# Patient Record
Sex: FEMALE | Race: BLACK OR AFRICAN AMERICAN | NOT HISPANIC OR LATINO
[De-identification: names, ages, dates, MRNs, and addresses within clinical notes are randomized per-mention and may not be internally consistent; named-entity substitution may affect disease eponyms.]

---

## 2020-08-13 ENCOUNTER — APPOINTMENT (OUTPATIENT)
Dept: ENDOCRINOLOGY | Facility: CLINIC | Age: 55
End: 2020-08-13
Payer: COMMERCIAL

## 2020-08-13 VITALS
HEIGHT: 72 IN | HEART RATE: 81 BPM | OXYGEN SATURATION: 98 % | BODY MASS INDEX: 31.42 KG/M2 | WEIGHT: 232 LBS | RESPIRATION RATE: 17 BRPM | SYSTOLIC BLOOD PRESSURE: 124 MMHG | DIASTOLIC BLOOD PRESSURE: 70 MMHG

## 2020-08-13 DIAGNOSIS — Z00.00 ENCOUNTER FOR GENERAL ADULT MEDICAL EXAMINATION W/OUT ABNORMAL FINDINGS: ICD-10-CM

## 2020-08-13 DIAGNOSIS — Z78.9 OTHER SPECIFIED HEALTH STATUS: ICD-10-CM

## 2020-08-13 DIAGNOSIS — G43.909 MIGRAINE, UNSPECIFIED, NOT INTRACTABLE, W/OUT STATUS MIGRAINOSUS: ICD-10-CM

## 2020-08-13 LAB — GLUCOSE BLDC GLUCOMTR-MCNC: 110

## 2020-08-13 PROCEDURE — 99204 OFFICE O/P NEW MOD 45 MIN: CPT

## 2020-08-13 RX ORDER — METFORMIN HYDROCHLORIDE 500 MG/1
500 TABLET, COATED ORAL DAILY
Refills: 0 | Status: DISCONTINUED | COMMUNITY
End: 2020-08-13

## 2020-08-13 RX ORDER — DOCUSATE SODIUM 100 MG/1
100 CAPSULE ORAL TWICE DAILY
Refills: 0 | Status: ACTIVE | COMMUNITY

## 2020-08-13 NOTE — HISTORY OF PRESENT ILLNESS
[FreeTextEntry1] : HISTORY OF PRESENT ILLNESS. \par \par Ms. GUSMAN was diagnosed with Diabetes Mellitus Type 2 in 2017\par Reports history HTN, and denies dyslipidemia, CAD,  known complications of retinopathy, nephropathy, or neuropathy. \par Presently on metformin 500 mg qd only. \par Blood sugars are  not checked at home\par Fingerstick glucose in the office today is 110 mg/dL 2 hours after eating. \par Diet: not following ADA\par Exercise: none\par \par Last dilated eye - 2019\par Last podiatry visit  - none\par Last cardiology evaluation - 2010\par Last stress test - 2010\par angio- 2010\par Last 2-D Echo - 2010\par Last nephrology evaluation - none\par Last neurology evaluation- 2018\par \par Lab review: a1c- 7.0, LDL- 84\par \par

## 2020-08-13 NOTE — ASSESSMENT
[FreeTextEntry1] : Current approaches to diabetes management are discussed with the patient. \par Target ranges for blood sugar, blood pressure and cholesterol reviewed, and risk reduction strategies verified. \par Hypoglycemia precautions reviewed with the patient. \par Suggested extensive diabetes education program, including nutritional and diabetes teaching and evaluation. \par Proper dietary restrictions and exercise routines discussed. \par Glucometer/SMBG and log book charting discussed.\par - switch to metformin er 500 gm 3 tablets daily\par - Vu 14 days and follow up with CDE in about 2 weeks\par - monitor fasting lipids\par - continue present antihypertensive regimen\par - can take ASA if no contraindications (need to verify with her GI given her history of diverticulitis)\par RTC 3 months

## 2020-09-14 ENCOUNTER — APPOINTMENT (OUTPATIENT)
Dept: ENDOCRINOLOGY | Facility: CLINIC | Age: 55
End: 2020-09-14
Payer: COMMERCIAL

## 2020-09-14 PROCEDURE — G0108 DIAB MANAGE TRN  PER INDIV: CPT

## 2020-11-12 ENCOUNTER — APPOINTMENT (OUTPATIENT)
Dept: ENDOCRINOLOGY | Facility: CLINIC | Age: 55
End: 2020-11-12
Payer: COMMERCIAL

## 2020-11-12 VITALS
RESPIRATION RATE: 17 BRPM | BODY MASS INDEX: 30.48 KG/M2 | TEMPERATURE: 98.9 F | HEART RATE: 87 BPM | DIASTOLIC BLOOD PRESSURE: 70 MMHG | HEIGHT: 72 IN | SYSTOLIC BLOOD PRESSURE: 112 MMHG | OXYGEN SATURATION: 98 % | WEIGHT: 225 LBS

## 2020-11-12 LAB — GLUCOSE BLDC GLUCOMTR-MCNC: 108

## 2020-11-12 PROCEDURE — 99214 OFFICE O/P EST MOD 30 MIN: CPT | Mod: 25

## 2020-11-12 PROCEDURE — 82962 GLUCOSE BLOOD TEST: CPT

## 2020-11-12 PROCEDURE — 99072 ADDL SUPL MATRL&STAF TM PHE: CPT

## 2020-11-12 PROCEDURE — 95251 CONT GLUC MNTR ANALYSIS I&R: CPT

## 2020-11-12 NOTE — HISTORY OF PRESENT ILLNESS
[FreeTextEntry1] : F/u for diabetes management\par \par *** Nov 12, 2020 ***\par \par taking metformin er 500 mg 1 tab tid  (having stomach issues otherwise if takes together). feels that her diverticulitis issues and stomach discomfort is worse sicne increasing metformin dose\par labs from 10/29/20 - a1c- 6.5, 25D- 34.7, LDL- 93, TSH- 0.866, 279\par \par Date of download:  11/12/20\par Diabetes Medications and Dosage: as above\par Indication for CGMS: verify a change in the treatment regimen, identify periods of hypoglycemia/ hyperglycemia. \par Modal day report: pattern. \par Pt with HYPO  5% of the time ( 1% below 54),  94% in target range\par Hyperglycemia:  0% elevation \par Identified issues: carbohydrate counting, few episodes of mid 60's\par dates analyzed: 10/30-20- 11/12/20\par \par \par HISTORY OF PRESENT ILLNESS. \par \par Ms. GUSMAN was diagnosed with Diabetes Mellitus Type 2 in 2017\par Reports history HTN, and denies dyslipidemia, CAD,  known complications of retinopathy, nephropathy, or neuropathy. \par Presently on metformin 500 mg qd only. \par Blood sugars are  not checked at home\par Fingerstick glucose in the office today is 110 mg/dL 2 hours after eating. \par Diet: not following ADA\par Exercise: none\par \par Lab review: a1c- 7.0, LDL- 84\par \par \par ****\par \par Last dilated eye - 2019\par Last podiatry visit  - none\par Last cardiology evaluation - 2010\par Last stress test - 2010\par angio- 2010\par Last 2-D Echo - 2010\par Last nephrology evaluation - none\par Last neurology evaluation- 2018\par

## 2020-11-12 NOTE — ASSESSMENT
[FreeTextEntry1] : - decr metformin er 500 mg 1 tab with dinner\par - Tradjenta 5 mg qd\par - Vu 14 days and follow up with CDE in about 2 weeks\par - monitor fasting lipids\par - continue present antihypertensive regimen\par - can take ASA if no contraindications (need to verify with her GI given her history of diverticulitis)\par RTC 3 months

## 2021-02-25 ENCOUNTER — APPOINTMENT (OUTPATIENT)
Dept: ENDOCRINOLOGY | Facility: CLINIC | Age: 56
End: 2021-02-25
Payer: COMMERCIAL

## 2021-02-25 VITALS
RESPIRATION RATE: 17 BRPM | WEIGHT: 226 LBS | TEMPERATURE: 98.7 F | BODY MASS INDEX: 30.61 KG/M2 | HEART RATE: 76 BPM | DIASTOLIC BLOOD PRESSURE: 86 MMHG | HEIGHT: 72 IN | OXYGEN SATURATION: 98 % | SYSTOLIC BLOOD PRESSURE: 121 MMHG

## 2021-02-25 LAB — GLUCOSE BLDC GLUCOMTR-MCNC: 131

## 2021-02-25 PROCEDURE — 99072 ADDL SUPL MATRL&STAF TM PHE: CPT

## 2021-02-25 PROCEDURE — 82962 GLUCOSE BLOOD TEST: CPT

## 2021-02-25 PROCEDURE — 99214 OFFICE O/P EST MOD 30 MIN: CPT | Mod: 25

## 2021-02-25 PROCEDURE — 95251 CONT GLUC MNTR ANALYSIS I&R: CPT

## 2021-02-25 RX ORDER — FLASH GLUCOSE SENSOR
KIT MISCELLANEOUS
Qty: 6 | Refills: 11 | Status: ACTIVE | COMMUNITY
Start: 2020-08-13 | End: 1900-01-01

## 2021-02-25 NOTE — HISTORY OF PRESENT ILLNESS
[FreeTextEntry1] : F/u for diabetes management\par \par *** Feb 25, 2021 ***\par \par on metformin er 500 mg 1 tab with dinner,  Tradjenta 5 mg qd\par feels well on this regimen. no stomach issue\par labs from 2/4/21- a1c- 6.6%\par \par wearing johnny 2\par Date of download:  2/25/21\par Diabetes Medications and Dosage: as above\par Indication for CGMS: verify a change in the treatment regimen, identify periods of hypoglycemia/ hyperglycemia. \par Modal day report: pattern. \par Pt with HYPO  1% of the time (NONE below 54),  98% in target range\par Hyperglycemia:  1% elevation \par Identified issues: carbohydrate counting\par dates analyzed: 2/12/21-2/25/21\par \par \par *** Nov 12, 2020 ***\par \par taking metformin er 500 mg 1 tab tid  (having stomach issues otherwise if takes together). feels that her diverticulitis issues and stomach discomfort is worse sicne increasing metformin dose\par labs from 10/29/20 - a1c- 6.5, 25D- 34.7, LDL- 93, TSH- 0.866, 279\par \par Date of download:  11/12/20\par Diabetes Medications and Dosage: as above\par Indication for CGMS: verify a change in the treatment regimen, identify periods of hypoglycemia/ hyperglycemia. \par Modal day report: pattern. \par Pt with HYPO  5% of the time ( 1% below 54),  94% in target range\par Hyperglycemia:  0% elevation \par Identified issues: carbohydrate counting, few episodes of mid 60's\par dates analyzed: 10/30-20- 11/12/20\par \par \par HISTORY OF PRESENT ILLNESS. \par \par Ms. GUSMAN was diagnosed with Diabetes Mellitus Type 2 in 2017\par Reports history HTN, and denies dyslipidemia, CAD,  known complications of retinopathy, nephropathy, or neuropathy. \par Presently on metformin 500 mg qd only. \par Blood sugars are  not checked at home\par Fingerstick glucose in the office today is 110 mg/dL 2 hours after eating. \par Diet: not following ADA\par Exercise: none\par \par Lab review: a1c- 7.0, LDL- 84\par \par \par ****\par \par Last dilated eye - 2019\par Last podiatry visit  - none\par Last cardiology evaluation - 2010\par Last stress test - 2010\par angio- 2010\par Last 2-D Echo - 2010\par Last nephrology evaluation - none\par Last neurology evaluation- 2018\par

## 2021-02-25 NOTE — ASSESSMENT
[FreeTextEntry1] : - cont metformin er 500 mg 1 tab with dinner,  Tradjenta 5 mg qd\par - Vu 14 days and follow up with CDE in about 2 weeks\par - monitor fasting lipids\par - continue present antihypertensive regimen\par - can take ASA if no contraindications (need to verify with her GI given her history of diverticulitis)\par RTC 3 months

## 2021-06-17 ENCOUNTER — APPOINTMENT (OUTPATIENT)
Dept: ENDOCRINOLOGY | Facility: CLINIC | Age: 56
End: 2021-06-17
Payer: COMMERCIAL

## 2021-06-17 VITALS
HEART RATE: 74 BPM | DIASTOLIC BLOOD PRESSURE: 60 MMHG | HEIGHT: 72 IN | SYSTOLIC BLOOD PRESSURE: 140 MMHG | WEIGHT: 225 LBS | OXYGEN SATURATION: 98 % | BODY MASS INDEX: 30.48 KG/M2 | TEMPERATURE: 98 F | RESPIRATION RATE: 17 BRPM

## 2021-06-17 DIAGNOSIS — E11.65 TYPE 2 DIABETES MELLITUS WITH HYPERGLYCEMIA: ICD-10-CM

## 2021-06-17 LAB — GLUCOSE BLDC GLUCOMTR-MCNC: 105

## 2021-06-17 PROCEDURE — 99214 OFFICE O/P EST MOD 30 MIN: CPT | Mod: 25

## 2021-06-17 PROCEDURE — 82962 GLUCOSE BLOOD TEST: CPT

## 2021-06-17 PROCEDURE — 99072 ADDL SUPL MATRL&STAF TM PHE: CPT

## 2021-06-17 NOTE — ASSESSMENT
[FreeTextEntry1] : - cont metformin er 500 mg 1 tab with dinner,  Tradjenta 5 mg qd\par - resume Vu personal sensor\par - monitor fasting lipids. I've advised on statins, but she wants to normalize the diet first. Mutually agreed to add statins next visit if no improvement\par - continue present antihypertensive regimen\par - can take ASA if no contraindications (need to verify with her GI given her history of diverticulitis)\par RTC 3 months

## 2021-06-17 NOTE — HISTORY OF PRESENT ILLNESS
[FreeTextEntry1] : F/u for diabetes management\par \par *** Jun 17, 2021 ***\par \par on metformin er 500 mg 1 tab with dinner,  Tradjenta 5 mg qd\par labs from 5/19/21- a1c- 6.4, LDL- 123\par reports fbs- low 100's, ppg upto 140\par diet is higher in fats recently\par \par *** Feb 25, 2021 ***\par \par on metformin er 500 mg 1 tab with dinner,  Tradjenta 5 mg qd\par feels well on this regimen. no stomach issue\par labs from 2/4/21- a1c- 6.6%\par \par wearing johnny 2\par Date of download:  2/25/21\par Diabetes Medications and Dosage: as above\par Indication for CGMS: verify a change in the treatment regimen, identify periods of hypoglycemia/ hyperglycemia. \par Modal day report: pattern. \par Pt with HYPO  1% of the time (NONE below 54),  98% in target range\par Hyperglycemia:  1% elevation \par Identified issues: carbohydrate counting\par dates analyzed: 2/12/21-2/25/21\par \par \par *** Nov 12, 2020 ***\par \par taking metformin er 500 mg 1 tab tid  (having stomach issues otherwise if takes together). feels that her diverticulitis issues and stomach discomfort is worse sicne increasing metformin dose\par labs from 10/29/20 - a1c- 6.5, 25D- 34.7, LDL- 93, TSH- 0.866, 279\par \par Date of download:  11/12/20\par Diabetes Medications and Dosage: as above\par Indication for CGMS: verify a change in the treatment regimen, identify periods of hypoglycemia/ hyperglycemia. \par Modal day report: pattern. \par Pt with HYPO  5% of the time ( 1% below 54),  94% in target range\par Hyperglycemia:  0% elevation \par Identified issues: carbohydrate counting, few episodes of mid 60's\par dates analyzed: 10/30-20- 11/12/20\par \par \par HISTORY OF PRESENT ILLNESS. \par \par Ms. GUSMAN was diagnosed with Diabetes Mellitus Type 2 in 2017\par Reports history HTN, and denies dyslipidemia, CAD,  known complications of retinopathy, nephropathy, or neuropathy. \par Presently on metformin 500 mg qd only. \par Blood sugars are  not checked at home\par Fingerstick glucose in the office today is 110 mg/dL 2 hours after eating. \par Diet: not following ADA\par Exercise: none\par \par Lab review: a1c- 7.0, LDL- 84\par \par \par ****\par \par Last dilated eye - 2020\par Last podiatry visit  - 2020\par Last cardiology evaluation - 2010\par Last stress test - 2010\par angio- 2010\par Last 2-D Echo - 2010\par Last nephrology evaluation - none\par Last neurology evaluation- 2018\par

## 2021-12-02 ENCOUNTER — APPOINTMENT (OUTPATIENT)
Dept: ENDOCRINOLOGY | Facility: CLINIC | Age: 56
End: 2021-12-02
Payer: COMMERCIAL

## 2021-12-02 VITALS
WEIGHT: 225 LBS | RESPIRATION RATE: 17 BRPM | SYSTOLIC BLOOD PRESSURE: 130 MMHG | OXYGEN SATURATION: 98 % | HEIGHT: 72 IN | DIASTOLIC BLOOD PRESSURE: 70 MMHG | BODY MASS INDEX: 30.48 KG/M2 | TEMPERATURE: 97.8 F | HEART RATE: 89 BPM

## 2021-12-02 LAB — GLUCOSE BLDC GLUCOMTR-MCNC: 173

## 2021-12-02 PROCEDURE — 99214 OFFICE O/P EST MOD 30 MIN: CPT | Mod: 25

## 2021-12-02 PROCEDURE — 82962 GLUCOSE BLOOD TEST: CPT

## 2021-12-02 PROCEDURE — 36415 COLL VENOUS BLD VENIPUNCTURE: CPT

## 2021-12-02 RX ORDER — FLASH GLUCOSE SENSOR
KIT MISCELLANEOUS
Qty: 6 | Refills: 3 | Status: ACTIVE | COMMUNITY
Start: 2021-12-02 | End: 1900-01-01

## 2021-12-02 NOTE — HISTORY OF PRESENT ILLNESS
[FreeTextEntry1] : F/u for diabetes management\par \par *** Dec 02, 2021 ***\par \par on metformin er 500 mg 1 tab with dinner,  Tradjenta 5 mg qd\par did not use johnny , b/o it had malfunctioned\par reports fbs- under 100, ppg - 120's\par today ppg in the office- 173\par \par *** Jun 17, 2021 ***\par \par on metformin er 500 mg 1 tab with dinner,  Tradjenta 5 mg qd\par labs from 5/19/21- a1c- 6.4, LDL- 123\par reports fbs- low 100's, ppg upto 140\par diet is higher in fats recently\par \par *** Feb 25, 2021 ***\par \par on metformin er 500 mg 1 tab with dinner,  Tradjenta 5 mg qd\par feels well on this regimen. no stomach issue\par labs from 2/4/21- a1c- 6.6%\par \par wearing johnny 2\par Date of download:  2/25/21\par Diabetes Medications and Dosage: as above\par Indication for CGMS: verify a change in the treatment regimen, identify periods of hypoglycemia/ hyperglycemia. \par Modal day report: pattern. \par Pt with HYPO  1% of the time (NONE below 54),  98% in target range\par Hyperglycemia:  1% elevation \par Identified issues: carbohydrate counting\par dates analyzed: 2/12/21-2/25/21\par \par \par *** Nov 12, 2020 ***\par \par taking metformin er 500 mg 1 tab tid  (having stomach issues otherwise if takes together). feels that her diverticulitis issues and stomach discomfort is worse sicne increasing metformin dose\par labs from 10/29/20 - a1c- 6.5, 25D- 34.7, LDL- 93, TSH- 0.866, 279\par \par Date of download:  11/12/20\par Diabetes Medications and Dosage: as above\par Indication for CGMS: verify a change in the treatment regimen, identify periods of hypoglycemia/ hyperglycemia. \par Modal day report: pattern. \par Pt with HYPO  5% of the time ( 1% below 54),  94% in target range\par Hyperglycemia:  0% elevation \par Identified issues: carbohydrate counting, few episodes of mid 60's\par dates analyzed: 10/30-20- 11/12/20\par \par \par HISTORY OF PRESENT ILLNESS. \par \par Ms. GUSMAN was diagnosed with Diabetes Mellitus Type 2 in 2017\par Reports history HTN, and denies dyslipidemia, CAD,  known complications of retinopathy, nephropathy, or neuropathy. \par Presently on metformin 500 mg qd only. \par Blood sugars are  not checked at home\par Fingerstick glucose in the office today is 110 mg/dL 2 hours after eating. \par Diet: not following ADA\par Exercise: none\par \par Lab review: a1c- 7.0, LDL- 84\par \par \par ****\par \par Last dilated eye - 2020\par Last podiatry visit  - 2020\par Last cardiology evaluation - 2010\par Last stress test - 2010\par angio- 2010\par Last 2-D Echo - 2010\par Last nephrology evaluation - none\par Last neurology evaluation- 2018\par

## 2021-12-02 NOTE — ASSESSMENT
[FreeTextEntry1] : - cont metformin er 500 mg 1 tab with dinner,  Tradjenta 5 mg qd\par - resume Vu 2\par - monitor fasting lipids and likely will start statins\par - continue present antihypertensive regimen\par - can take ASA if no contraindications (need to verify with her GI given her history of diverticulitis)\par RTC 3 months

## 2021-12-03 LAB
25(OH)D3 SERPL-MCNC: 37.2 NG/ML
ALBUMIN SERPL ELPH-MCNC: 4.5 G/DL
ALP BLD-CCNC: 110 U/L
ALT SERPL-CCNC: 10 U/L
ANION GAP SERPL CALC-SCNC: 14 MMOL/L
AST SERPL-CCNC: 16 U/L
BILIRUB SERPL-MCNC: 0.2 MG/DL
BUN SERPL-MCNC: 13 MG/DL
CALCIUM SERPL-MCNC: 9.5 MG/DL
CHLORIDE SERPL-SCNC: 98 MMOL/L
CHOLEST SERPL-MCNC: 180 MG/DL
CO2 SERPL-SCNC: 26 MMOL/L
CREAT SERPL-MCNC: 0.68 MG/DL
CREAT SPEC-SCNC: 184 MG/DL
ESTIMATED AVERAGE GLUCOSE: 140 MG/DL
FOLATE SERPL-MCNC: 9.9 NG/ML
FRUCTOSAMINE SERPL-MCNC: 287 UMOL/L
GLUCOSE SERPL-MCNC: 141 MG/DL
HBA1C MFR BLD HPLC: 6.5 %
HDLC SERPL-MCNC: 70 MG/DL
LDLC SERPL CALC-MCNC: 103 MG/DL
MICROALBUMIN 24H UR DL<=1MG/L-MCNC: <1.2 MG/DL
MICROALBUMIN/CREAT 24H UR-RTO: NORMAL MG/G
NONHDLC SERPL-MCNC: 111 MG/DL
POTASSIUM SERPL-SCNC: 4.7 MMOL/L
PROT SERPL-MCNC: 7.4 G/DL
SODIUM SERPL-SCNC: 137 MMOL/L
TRIGL SERPL-MCNC: 39 MG/DL
TSH SERPL-ACNC: 0.91 UIU/ML
VIT B12 SERPL-MCNC: 1307 PG/ML

## 2022-08-11 ENCOUNTER — TRANSCRIPTION ENCOUNTER (OUTPATIENT)
Age: 57
End: 2022-08-11

## 2022-08-11 ENCOUNTER — APPOINTMENT (OUTPATIENT)
Dept: ENDOCRINOLOGY | Facility: CLINIC | Age: 57
End: 2022-08-11

## 2022-08-11 VITALS
SYSTOLIC BLOOD PRESSURE: 110 MMHG | HEART RATE: 89 BPM | RESPIRATION RATE: 17 BRPM | OXYGEN SATURATION: 98 % | WEIGHT: 231 LBS | BODY MASS INDEX: 31.29 KG/M2 | TEMPERATURE: 98 F | HEIGHT: 72 IN | DIASTOLIC BLOOD PRESSURE: 80 MMHG

## 2022-08-11 DIAGNOSIS — E28.2 POLYCYSTIC OVARIAN SYNDROME: ICD-10-CM

## 2022-08-11 LAB — GLUCOSE BLDC GLUCOMTR-MCNC: 140

## 2022-08-11 PROCEDURE — 36415 COLL VENOUS BLD VENIPUNCTURE: CPT

## 2022-08-11 PROCEDURE — 82962 GLUCOSE BLOOD TEST: CPT

## 2022-08-11 PROCEDURE — 99214 OFFICE O/P EST MOD 30 MIN: CPT | Mod: 25

## 2022-08-11 NOTE — ASSESSMENT
[Diabetes Foot Care] : diabetes foot care [Long Term Vascular Complications] : long term vascular complications of diabetes [Carbohydrate Consistent Diet] : carbohydrate consistent diet [Importance of Diet and Exercise] : importance of diet and exercise to improve glycemic control, achieve weight loss and improve cardiovascular health [Exercise/Effect on Glucose] : exercise/effect on glucose [Hypoglycemia Management] : hypoglycemia management [Glucagon Use] : glucagon use [Self Monitoring of Blood Glucose] : self monitoring of blood glucose [Retinopathy Screening] : Patient was referred to ophthalmology for retinopathy screening [Diabetic Medications] : Risks and benefits of diabetic medications were discussed [FreeTextEntry1] : - labs today\par - d/c metformin and Tradjenta\par - Mounjaro 2.5 mg qw and uptitrate\par - monitor fasting lipids and likely will start statins\par - continue present antihypertensive regimen\par - can take ASA if no contraindications (need to verify with her GI given her history of diverticulitis)\par RTC 3 months

## 2022-08-11 NOTE — HISTORY OF PRESENT ILLNESS
[FreeTextEntry1] : F/u for diabetes management\par \par *** Aug 11, 2022 ***\par \par very inconsistent with metformin, but takes Tradjenta 5 mg qd\par concerned with a weight gain\par dexcom is expensive, Vu - does not stick well to the skin\par reports fbs- 80's, ppg- 120's\par \par *** Dec 02, 2021 ***\par \par on metformin er 500 mg 1 tab with dinner,  Tradjenta 5 mg qd\par did not use vu , b/o it had malfunctioned\par reports fbs- under 100, ppg - 120's\par today ppg in the office- 173\par \par *** Jun 17, 2021 ***\par \par on metformin er 500 mg 1 tab with dinner,  Tradjenta 5 mg qd\par labs from 5/19/21- a1c- 6.4, LDL- 123\par reports fbs- low 100's, ppg upto 140\par diet is higher in fats recently\par \par *** Feb 25, 2021 ***\par \par on metformin er 500 mg 1 tab with dinner,  Tradjenta 5 mg qd\par feels well on this regimen. no stomach issue\par labs from 2/4/21- a1c- 6.6%\par \par wearing vu 2\par Date of download:  2/25/21\par Diabetes Medications and Dosage: as above\par Indication for CGMS: verify a change in the treatment regimen, identify periods of hypoglycemia/ hyperglycemia. \par Modal day report: pattern. \par Pt with HYPO  1% of the time (NONE below 54),  98% in target range\par Hyperglycemia:  1% elevation \par Identified issues: carbohydrate counting\par dates analyzed: 2/12/21-2/25/21\par \par \par *** Nov 12, 2020 ***\par \par taking metformin er 500 mg 1 tab tid  (having stomach issues otherwise if takes together). feels that her diverticulitis issues and stomach discomfort is worse sicne increasing metformin dose\par labs from 10/29/20 - a1c- 6.5, 25D- 34.7, LDL- 93, TSH- 0.866, 279\par \par Date of download:  11/12/20\par Diabetes Medications and Dosage: as above\par Indication for CGMS: verify a change in the treatment regimen, identify periods of hypoglycemia/ hyperglycemia. \par Modal day report: pattern. \par Pt with HYPO  5% of the time ( 1% below 54),  94% in target range\par Hyperglycemia:  0% elevation \par Identified issues: carbohydrate counting, few episodes of mid 60's\par dates analyzed: 10/30-20- 11/12/20\par \par \par HISTORY OF PRESENT ILLNESS. \par \par Ms. GUSMAN was diagnosed with Diabetes Mellitus Type 2 in 2017\par Reports history HTN, and denies dyslipidemia, CAD,  known complications of retinopathy, nephropathy, or neuropathy. \par Presently on metformin 500 mg qd only. \par Blood sugars are  not checked at home\par Fingerstick glucose in the office today is 110 mg/dL 2 hours after eating. \par Diet: not following ADA\par Exercise: none\par \par Lab review: a1c- 7.0, LDL- 84\par \par \par ****\par \par Last dilated eye - 2020\par Last podiatry visit  - 2020\par Last cardiology evaluation - 2010\par Last stress test - 2010\par angio- 2010\par Last 2-D Echo - 2010\par Last nephrology evaluation - none\par Last neurology evaluation- 2018\par

## 2022-08-15 ENCOUNTER — TRANSCRIPTION ENCOUNTER (OUTPATIENT)
Age: 57
End: 2022-08-15

## 2022-08-15 LAB
25(OH)D3 SERPL-MCNC: 32.5 NG/ML
ALBUMIN SERPL ELPH-MCNC: 4.3 G/DL
ALP BLD-CCNC: 103 U/L
ALT SERPL-CCNC: 27 U/L
ANION GAP SERPL CALC-SCNC: 13 MMOL/L
AST SERPL-CCNC: 25 U/L
BILIRUB SERPL-MCNC: 0.2 MG/DL
BUN SERPL-MCNC: 14 MG/DL
CALCIUM SERPL-MCNC: 9.4 MG/DL
CHLORIDE SERPL-SCNC: 102 MMOL/L
CHOLEST SERPL-MCNC: 188 MG/DL
CO2 SERPL-SCNC: 26 MMOL/L
CREAT SERPL-MCNC: 0.89 MG/DL
CREAT SPEC-SCNC: 268 MG/DL
EGFR: 77 ML/MIN/1.73M2
ESTIMATED AVERAGE GLUCOSE: 148 MG/DL
FOLATE SERPL-MCNC: 8.8 NG/ML
FRUCTOSAMINE SERPL-MCNC: 291 UMOL/L
GLUCOSE SERPL-MCNC: 117 MG/DL
HBA1C MFR BLD HPLC: 6.8 %
HDLC SERPL-MCNC: 78 MG/DL
LDLC SERPL CALC-MCNC: 97 MG/DL
MICROALBUMIN 24H UR DL<=1MG/L-MCNC: 1.6 MG/DL
MICROALBUMIN/CREAT 24H UR-RTO: 6 MG/G
NONHDLC SERPL-MCNC: 110 MG/DL
POTASSIUM SERPL-SCNC: 4.7 MMOL/L
PROT SERPL-MCNC: 7.3 G/DL
SODIUM SERPL-SCNC: 140 MMOL/L
T4 FREE SERPL-MCNC: 1.1 NG/DL
TRIGL SERPL-MCNC: 64 MG/DL
TSH SERPL-ACNC: 0.93 UIU/ML
VIT B12 SERPL-MCNC: 1430 PG/ML

## 2022-12-01 ENCOUNTER — APPOINTMENT (OUTPATIENT)
Dept: ENDOCRINOLOGY | Facility: CLINIC | Age: 57
End: 2022-12-01

## 2022-12-01 VITALS
BODY MASS INDEX: 27.09 KG/M2 | SYSTOLIC BLOOD PRESSURE: 110 MMHG | WEIGHT: 200 LBS | DIASTOLIC BLOOD PRESSURE: 70 MMHG | HEIGHT: 72 IN | HEART RATE: 82 BPM | TEMPERATURE: 97.8 F

## 2022-12-01 LAB — GLUCOSE BLDC GLUCOMTR-MCNC: 108

## 2022-12-01 PROCEDURE — 36415 COLL VENOUS BLD VENIPUNCTURE: CPT

## 2022-12-01 PROCEDURE — 99214 OFFICE O/P EST MOD 30 MIN: CPT | Mod: 25

## 2022-12-01 PROCEDURE — 82962 GLUCOSE BLOOD TEST: CPT

## 2022-12-01 RX ORDER — LINAGLIPTIN 5 MG/1
5 TABLET, FILM COATED ORAL
Qty: 90 | Refills: 3 | Status: DISCONTINUED | COMMUNITY
Start: 2020-11-12 | End: 2022-12-01

## 2022-12-01 NOTE — HISTORY OF PRESENT ILLNESS
[FreeTextEntry1] : F/u for diabetes management\par \par *** Dec 01, 2022 ***\par \par off metformin\par taking Mounjaro 7.5 mg qw. Lost 30 lbs, and feels great but  does not want to increase the dose anymore\par not drinking  enough fluids\par stopped checking her FS\par \par *** Aug 11, 2022 ***\par \par very inconsistent with metformin, but takes Tradjenta 5 mg qd\par concerned with a weight gain\par dexcom is expensive, Vu - does not stick well to the skin\par reports fbs- 80's, ppg- 120's\par \par *** Dec 02, 2021 ***\par \par on metformin er 500 mg 1 tab with dinner,  Tradjenta 5 mg qd\par did not use vu , b/o it had malfunctioned\par reports fbs- under 100, ppg - 120's\par today ppg in the office- 173\par \par *** Jun 17, 2021 ***\par \par on metformin er 500 mg 1 tab with dinner,  Tradjenta 5 mg qd\par labs from 5/19/21- a1c- 6.4, LDL- 123\par reports fbs- low 100's, ppg upto 140\par diet is higher in fats recently\par \par *** Feb 25, 2021 ***\par \par on metformin er 500 mg 1 tab with dinner,  Tradjenta 5 mg qd\par feels well on this regimen. no stomach issue\par labs from 2/4/21- a1c- 6.6%\par \par wearing vu 2\par Date of download:  2/25/21\par Diabetes Medications and Dosage: as above\par Indication for CGMS: verify a change in the treatment regimen, identify periods of hypoglycemia/ hyperglycemia. \par Modal day report: pattern. \par Pt with HYPO  1% of the time (NONE below 54),  98% in target range\par Hyperglycemia:  1% elevation \par Identified issues: carbohydrate counting\par dates analyzed: 2/12/21-2/25/21\par \par \par *** Nov 12, 2020 ***\par \par taking metformin er 500 mg 1 tab tid  (having stomach issues otherwise if takes together). feels that her diverticulitis issues and stomach discomfort is worse sicne increasing metformin dose\par labs from 10/29/20 - a1c- 6.5, 25D- 34.7, LDL- 93, TSH- 0.866, 279\par \par Date of download:  11/12/20\par Diabetes Medications and Dosage: as above\par Indication for CGMS: verify a change in the treatment regimen, identify periods of hypoglycemia/ hyperglycemia. \par Modal day report: pattern. \par Pt with HYPO  5% of the time ( 1% below 54),  94% in target range\par Hyperglycemia:  0% elevation \par Identified issues: carbohydrate counting, few episodes of mid 60's\par dates analyzed: 10/30-20- 11/12/20\par \par \par HISTORY OF PRESENT ILLNESS. \par \par Ms. GUSMAN was diagnosed with Diabetes Mellitus Type 2 in 2017\par Reports history HTN, and denies dyslipidemia, CAD,  known complications of retinopathy, nephropathy, or neuropathy. \par Presently on metformin 500 mg qd only. \par Blood sugars are  not checked at home\par Fingerstick glucose in the office today is 110 mg/dL 2 hours after eating. \par Diet: not following ADA\par Exercise: none\par \par Lab review: a1c- 7.0, LDL- 84\par \par \par ****\par \par Last dilated eye - 2020\par Last podiatry visit  - 2020\par Last cardiology evaluation - 2010\par Last stress test - 2010\par angio- 2010\par Last 2-D Echo - 2010\par Last nephrology evaluation - none\par Last neurology evaluation- 2018\par

## 2022-12-01 NOTE — ASSESSMENT
[Diabetes Foot Care] : diabetes foot care [Long Term Vascular Complications] : long term vascular complications of diabetes [Carbohydrate Consistent Diet] : carbohydrate consistent diet [Importance of Diet and Exercise] : importance of diet and exercise to improve glycemic control, achieve weight loss and improve cardiovascular health [Exercise/Effect on Glucose] : exercise/effect on glucose [Hypoglycemia Management] : hypoglycemia management [Glucagon Use] : glucagon use [Self Monitoring of Blood Glucose] : self monitoring of blood glucose [Retinopathy Screening] : Patient was referred to ophthalmology for retinopathy screening [Diabetic Medications] : Risks and benefits of diabetic medications were discussed [FreeTextEntry1] : T2DM\par - great weight loss on Mounjaro, but she does not want to increase the dose\par - continue Mounjaro 2.5 mg qw\par - labs today\par - increase fluid intake\par - monitor fasting lipids and likely will start statins\par - continue present antihypertensive regimen\par - can take ASA if no contraindications (need to verify with her GI given her history of diverticulitis)\par RTC 3 months

## 2022-12-02 ENCOUNTER — TRANSCRIPTION ENCOUNTER (OUTPATIENT)
Age: 57
End: 2022-12-02

## 2022-12-02 LAB
ALBUMIN SERPL ELPH-MCNC: 4 G/DL
ALP BLD-CCNC: 89 U/L
ALT SERPL-CCNC: 9 U/L
ANION GAP SERPL CALC-SCNC: 15 MMOL/L
AST SERPL-CCNC: 14 U/L
BILIRUB SERPL-MCNC: 0.3 MG/DL
BUN SERPL-MCNC: 6 MG/DL
CALCIUM SERPL-MCNC: 9.2 MG/DL
CHLORIDE SERPL-SCNC: 98 MMOL/L
CHOLEST SERPL-MCNC: 146 MG/DL
CO2 SERPL-SCNC: 27 MMOL/L
CREAT SERPL-MCNC: 0.7 MG/DL
CREAT SPEC-SCNC: 207 MG/DL
EGFR: 102 ML/MIN/1.73M2
ESTIMATED AVERAGE GLUCOSE: 123 MG/DL
FRUCTOSAMINE SERPL-MCNC: 233 UMOL/L
GLUCOSE SERPL-MCNC: 89 MG/DL
HBA1C MFR BLD HPLC: 5.9 %
HDLC SERPL-MCNC: 58 MG/DL
LDLC SERPL CALC-MCNC: 80 MG/DL
MICROALBUMIN 24H UR DL<=1MG/L-MCNC: 9.9 MG/DL
MICROALBUMIN/CREAT 24H UR-RTO: 48 MG/G
NONHDLC SERPL-MCNC: 88 MG/DL
POTASSIUM SERPL-SCNC: 3.8 MMOL/L
PROT SERPL-MCNC: 7.1 G/DL
SODIUM SERPL-SCNC: 140 MMOL/L
T4 FREE SERPL-MCNC: 1.2 NG/DL
TRIGL SERPL-MCNC: 42 MG/DL
TSH SERPL-ACNC: 0.96 UIU/ML

## 2023-01-27 ENCOUNTER — TRANSCRIPTION ENCOUNTER (OUTPATIENT)
Age: 58
End: 2023-01-27

## 2023-02-02 ENCOUNTER — APPOINTMENT (OUTPATIENT)
Dept: ENDOCRINOLOGY | Facility: CLINIC | Age: 58
End: 2023-02-02
Payer: COMMERCIAL

## 2023-02-02 VITALS
WEIGHT: 185 LBS | HEIGHT: 72 IN | BODY MASS INDEX: 25.06 KG/M2 | RESPIRATION RATE: 16 BRPM | HEART RATE: 82 BPM | DIASTOLIC BLOOD PRESSURE: 68 MMHG | SYSTOLIC BLOOD PRESSURE: 118 MMHG | OXYGEN SATURATION: 98 % | TEMPERATURE: 97.3 F

## 2023-02-02 LAB — GLUCOSE BLDC GLUCOMTR-MCNC: 100

## 2023-02-02 PROCEDURE — 82962 GLUCOSE BLOOD TEST: CPT

## 2023-02-02 PROCEDURE — 99214 OFFICE O/P EST MOD 30 MIN: CPT | Mod: 25

## 2023-02-02 PROCEDURE — 36415 COLL VENOUS BLD VENIPUNCTURE: CPT

## 2023-02-02 NOTE — QUALITY MEASURES
[facility] COGNITIVE ASSESSMENT ([facility])  Version 7.1 Original Version  VISUOSPATIAL/EXECUTIVE               COPY CUBE      [  1  ]                                [  0  ] DRAW CLOCK (Ten past eleven)  (3 points)    [  1  ]                    [ 1   ]               [ 1   ]       Contour            Numbers     Hands POINTS                 4  / 5   NAMING    [ 1  ]                                                                        [ 0   ]                                             [   1 ]  Linoah Wheeler                                Camel                   2  / 3   MEMORY Read list of words, subject must repeat them. Do 2 trials, even if 1st trial is successful. Do a recall after 5 minutes  FACE VELVET [place] RICHA RED No Points    1st          2nd         ATTENTION Read list of digits (1 digit/sec) Subject has to repeat in the forward order       [ 1   ]   2  1  8  5  4                                [ 1   ] 7 4 2                        2  /2   Read list of letters. The subject must tap with his hand at each letter A. No points if > 2 errors.  [  1  ] F B A C M N A A J K L B A F A K D E A A A J A M O F A A B             1 /1   Serial 7 subtraction starting at 100          [  1  ] 93         [  1  ] 86          [  1  ] 79          [  1  ] 72         [  1  ] 65   4 or 5 correct subtractions: 3 points,  2 or 3 correct: 2 points,  1correct: 1 point,   0 correct: 0 points           3 /3   LANGUAGE Repeat: I only know that Yang is the one to help today. [ 1    ]                                      The cat always hid under the couch when dogs were in the room. [ 1  ]              2 /2   Fluency: Name maximum number of words in one minute that begin with the letter F                                                                                                                    [  1  ] __12_ (N > 11 words)              1 /1   ABSTRACTION Similarity  between e.g. banana-orange=fruit                                                                   [  1  ] train-bicycle                      [  1  ] watch-ruler             2 /2   DELAYED  RECALL Has to recall words  WITH NO CUE FACE  [ 1   ] VELVET  [ 1   ] Yarsani  [ 1   ]  RICHA  [ 1   ] RED  [1    ] Points for UNCUED recall only           5 /5           OPTIONAL Category cue           Multiple choice cue          ORIENTATION  [ 1   ] Date     [ 1   ] Month       [  1  ] Year      [ 1   ] Day      [ 1   ] Place        [  1  ] City         6 /6   TOTAL  Normal > 26/30 Add 1 point if < 12 years education      27 /30                            [Visual inspection, sensory exam] : Foot exam, including visual inspection, sensory exam with mono filament, and pulse exam, was performed within the last 12 months

## 2023-02-02 NOTE — ASSESSMENT
[Diabetes Foot Care] : diabetes foot care [Long Term Vascular Complications] : long term vascular complications of diabetes [Carbohydrate Consistent Diet] : carbohydrate consistent diet [Importance of Diet and Exercise] : importance of diet and exercise to improve glycemic control, achieve weight loss and improve cardiovascular health [Exercise/Effect on Glucose] : exercise/effect on glucose [Hypoglycemia Management] : hypoglycemia management [Glucagon Use] : glucagon use [Self Monitoring of Blood Glucose] : self monitoring of blood glucose [Retinopathy Screening] : Patient was referred to ophthalmology for retinopathy screening [Diabetic Medications] : Risks and benefits of diabetic medications were discussed [FreeTextEntry1] : T2DM\par - great weight loss on Mounjaro, she does not want to increase the dose and since unable to find her current dose, will decrease to 5 mg qw\par - labs today\par - increase fluid intake\par - monitor fasting lipids and decide about statins\par - continue present antihypertensive regimen\par - can take ASA if no contraindications (need to verify with her GI given her history of diverticulitis)\par RTC 4 months

## 2023-02-02 NOTE — HISTORY OF PRESENT ILLNESS
[FreeTextEntry1] : F/u for diabetes management\par \par *** Feb 02, 2023 ***\par \par feels great , lost more weight (totally about 40 lbs)\par staying on Mounjaro 7.5 mg qw, but has difficulties getting it from the pharmacy\par \par \par *** Dec 01, 2022 ***\par \par off metformin\par taking Mounjaro 7.5 mg qw. Lost 30 lbs, and feels great but  does not want to increase the dose anymore\par not drinking  enough fluids\par stopped checking her FS\par \par *** Aug 11, 2022 ***\par \par very inconsistent with metformin, but takes Tradjenta 5 mg qd\par concerned with a weight gain\par dexcom is expensive, Vu - does not stick well to the skin\par reports fbs- 80's, ppg- 120's\par \par *** Dec 02, 2021 ***\par \par on metformin er 500 mg 1 tab with dinner,  Tradjenta 5 mg qd\par did not use vu , b/o it had malfunctioned\par reports fbs- under 100, ppg - 120's\par today ppg in the office- 173\par \par *** Jun 17, 2021 ***\par \par on metformin er 500 mg 1 tab with dinner,  Tradjenta 5 mg qd\par labs from 5/19/21- a1c- 6.4, LDL- 123\par reports fbs- low 100's, ppg upto 140\par diet is higher in fats recently\par \par *** Feb 25, 2021 ***\par \par on metformin er 500 mg 1 tab with dinner,  Tradjenta 5 mg qd\par feels well on this regimen. no stomach issue\par labs from 2/4/21- a1c- 6.6%\par \par wearing vu 2\par Date of download:  2/25/21\par Diabetes Medications and Dosage: as above\par Indication for CGMS: verify a change in the treatment regimen, identify periods of hypoglycemia/ hyperglycemia. \par Modal day report: pattern. \par Pt with HYPO  1% of the time (NONE below 54),  98% in target range\par Hyperglycemia:  1% elevation \par Identified issues: carbohydrate counting\par dates analyzed: 2/12/21-2/25/21\par \par \par *** Nov 12, 2020 ***\par \par taking metformin er 500 mg 1 tab tid  (having stomach issues otherwise if takes together). feels that her diverticulitis issues and stomach discomfort is worse sicne increasing metformin dose\par labs from 10/29/20 - a1c- 6.5, 25D- 34.7, LDL- 93, TSH- 0.866, 279\par \par Date of download:  11/12/20\par Diabetes Medications and Dosage: as above\par Indication for CGMS: verify a change in the treatment regimen, identify periods of hypoglycemia/ hyperglycemia. \par Modal day report: pattern. \par Pt with HYPO  5% of the time ( 1% below 54),  94% in target range\par Hyperglycemia:  0% elevation \par Identified issues: carbohydrate counting, few episodes of mid 60's\par dates analyzed: 10/30-20- 11/12/20\par \par \par HISTORY OF PRESENT ILLNESS. \par \par Ms. GUSMAN was diagnosed with Diabetes Mellitus Type 2 in 2017\par Reports history HTN, and denies dyslipidemia, CAD,  known complications of retinopathy, nephropathy, or neuropathy. \par Presently on metformin 500 mg qd only. \par Blood sugars are  not checked at home\par Fingerstick glucose in the office today is 110 mg/dL 2 hours after eating. \par Diet: not following ADA\par Exercise: none\par \par Lab review: a1c- 7.0, LDL- 84\par \par \par ****\par \par Last dilated eye - 2020\par Last podiatry visit  - 2020\par Last cardiology evaluation - 2010\par Last stress test - 2010\par angio- 2010\par Last 2-D Echo - 2010\par Last nephrology evaluation - none\par Last neurology evaluation- 2018\par

## 2023-02-03 ENCOUNTER — TRANSCRIPTION ENCOUNTER (OUTPATIENT)
Age: 58
End: 2023-02-03

## 2023-02-03 LAB
ALBUMIN SERPL ELPH-MCNC: 4.3 G/DL
ALP BLD-CCNC: 95 U/L
ALT SERPL-CCNC: 8 U/L
ANION GAP SERPL CALC-SCNC: 10 MMOL/L
AST SERPL-CCNC: 11 U/L
BILIRUB SERPL-MCNC: 0.2 MG/DL
BUN SERPL-MCNC: 17 MG/DL
CALCIUM SERPL-MCNC: 10.5 MG/DL
CHLORIDE SERPL-SCNC: 100 MMOL/L
CO2 SERPL-SCNC: 28 MMOL/L
CREAT SERPL-MCNC: 0.8 MG/DL
EGFR: 87 ML/MIN/1.73M2
ESTIMATED AVERAGE GLUCOSE: 117 MG/DL
FRUCTOSAMINE SERPL-MCNC: 228 UMOL/L
GLUCOSE SERPL-MCNC: 85 MG/DL
HBA1C MFR BLD HPLC: 5.7 %
POTASSIUM SERPL-SCNC: 4.2 MMOL/L
PROT SERPL-MCNC: 7.4 G/DL
SODIUM SERPL-SCNC: 139 MMOL/L

## 2023-05-04 ENCOUNTER — APPOINTMENT (OUTPATIENT)
Dept: ENDOCRINOLOGY | Facility: CLINIC | Age: 58
End: 2023-05-04
Payer: COMMERCIAL

## 2023-05-04 VITALS
TEMPERATURE: 97.3 F | BODY MASS INDEX: 23.89 KG/M2 | DIASTOLIC BLOOD PRESSURE: 64 MMHG | HEART RATE: 112 BPM | HEIGHT: 72 IN | RESPIRATION RATE: 16 BRPM | SYSTOLIC BLOOD PRESSURE: 118 MMHG | OXYGEN SATURATION: 97 % | WEIGHT: 176.4 LBS

## 2023-05-04 LAB — GLUCOSE BLDC GLUCOMTR-MCNC: 94

## 2023-05-04 PROCEDURE — 99214 OFFICE O/P EST MOD 30 MIN: CPT | Mod: 25

## 2023-05-04 PROCEDURE — 82962 GLUCOSE BLOOD TEST: CPT

## 2023-05-04 PROCEDURE — 36415 COLL VENOUS BLD VENIPUNCTURE: CPT

## 2023-05-04 RX ORDER — HYDROCHLOROTHIAZIDE 12.5 MG/1
12.5 TABLET ORAL DAILY
Qty: 90 | Refills: 3 | Status: ACTIVE | COMMUNITY
Start: 1900-01-01 | End: 1900-01-01

## 2023-05-04 RX ORDER — METFORMIN ER 500 MG 500 MG/1
500 TABLET ORAL DAILY
Qty: 270 | Refills: 3 | Status: DISCONTINUED | COMMUNITY
Start: 2020-08-13 | End: 2023-05-04

## 2023-05-04 RX ORDER — BENAZEPRIL HYDROCHLORIDE 20 MG/1
20 TABLET, FILM COATED ORAL DAILY
Qty: 90 | Refills: 3 | Status: ACTIVE | COMMUNITY
Start: 1900-01-01 | End: 1900-01-01

## 2023-05-04 RX ORDER — BLOOD-GLUCOSE SENSOR
EACH MISCELLANEOUS
Qty: 9 | Refills: 4 | Status: ACTIVE | COMMUNITY
Start: 2023-05-04 | End: 1900-01-01

## 2023-05-04 NOTE — HISTORY OF PRESENT ILLNESS
[FreeTextEntry1] : F/u for diabetes management\par \par *** May 04, 2023 ***\par \par taking Mounjaro 5 mg qw. lost 10 more lbs\par tolerates well, no new c/o\par not checking FS at home. not using the sensor\par also, on Benazepril 40 mg qd (skips frequently d/t dizziness), HCTZ 12.5 mg \par \par *** Feb 02, 2023 ***\par \par feels great , lost more weight (totally about 40 lbs)\par staying on Mounjaro 7.5 mg qw, but has difficulties getting it from the pharmacy\par \par \par *** Dec 01, 2022 ***\par \par off metformin\par taking Mounjaro 7.5 mg qw. Lost 30 lbs, and feels great but  does not want to increase the dose anymore\par not drinking  enough fluids\par stopped checking her FS\par \par *** Aug 11, 2022 ***\par \par very inconsistent with metformin, but takes Tradjenta 5 mg qd\par concerned with a weight gain\par dexcom is expensive, Vu - does not stick well to the skin\par reports fbs- 80's, ppg- 120's\par \par *** Dec 02, 2021 ***\par \par on metformin er 500 mg 1 tab with dinner,  Tradjenta 5 mg qd\par did not use vu , b/o it had malfunctioned\par reports fbs- under 100, ppg - 120's\par today ppg in the office- 173\par \par *** Jun 17, 2021 ***\par \par on metformin er 500 mg 1 tab with dinner,  Tradjenta 5 mg qd\par labs from 5/19/21- a1c- 6.4, LDL- 123\par reports fbs- low 100's, ppg upto 140\par diet is higher in fats recently\par \par *** Feb 25, 2021 ***\par \par on metformin er 500 mg 1 tab with dinner,  Tradjenta 5 mg qd\par feels well on this regimen. no stomach issue\par labs from 2/4/21- a1c- 6.6%\par \par wearing vu 2\par Date of download:  2/25/21\par Diabetes Medications and Dosage: as above\par Indication for CGMS: verify a change in the treatment regimen, identify periods of hypoglycemia/ hyperglycemia. \par Modal day report: pattern. \par Pt with HYPO  1% of the time (NONE below 54),  98% in target range\par Hyperglycemia:  1% elevation \par Identified issues: carbohydrate counting\par dates analyzed: 2/12/21-2/25/21\par \par \par *** Nov 12, 2020 ***\par \par taking metformin er 500 mg 1 tab tid  (having stomach issues otherwise if takes together). feels that her diverticulitis issues and stomach discomfort is worse sicne increasing metformin dose\par labs from 10/29/20 - a1c- 6.5, 25D- 34.7, LDL- 93, TSH- 0.866, 279\par \par Date of download:  11/12/20\par Diabetes Medications and Dosage: as above\par Indication for CGMS: verify a change in the treatment regimen, identify periods of hypoglycemia/ hyperglycemia. \par Modal day report: pattern. \par Pt with HYPO  5% of the time ( 1% below 54),  94% in target range\par Hyperglycemia:  0% elevation \par Identified issues: carbohydrate counting, few episodes of mid 60's\par dates analyzed: 10/30-20- 11/12/20\par \par \par HISTORY OF PRESENT ILLNESS. \par \par Ms. GUSMAN was diagnosed with Diabetes Mellitus Type 2 in 2017\par Reports history HTN, and denies dyslipidemia, CAD,  known complications of retinopathy, nephropathy, or neuropathy. \par Presently on metformin 500 mg qd only. \par Blood sugars are  not checked at home\par Fingerstick glucose in the office today is 110 mg/dL 2 hours after eating. \par Diet: not following ADA\par Exercise: none\par \par Lab review: a1c- 7.0, LDL- 84\par \par \par ****\par \par Last dilated eye - 2022\par Last podiatry visit  - 2022\par Last cardiology evaluation - 2022 (Dr. Crook)\par Last stress test - 2010\par angio- 2010\par Last 2-D Echo - 2022\par Last nephrology evaluation - none\par Last neurology evaluation- 2018\par

## 2023-05-04 NOTE — ASSESSMENT
[Diabetes Foot Care] : diabetes foot care [Long Term Vascular Complications] : long term vascular complications of diabetes [Carbohydrate Consistent Diet] : carbohydrate consistent diet [Importance of Diet and Exercise] : importance of diet and exercise to improve glycemic control, achieve weight loss and improve cardiovascular health [Exercise/Effect on Glucose] : exercise/effect on glucose [Hypoglycemia Management] : hypoglycemia management [Glucagon Use] : glucagon use [Self Monitoring of Blood Glucose] : self monitoring of blood glucose [Retinopathy Screening] : Patient was referred to ophthalmology for retinopathy screening [Diabetic Medications] : Risks and benefits of diabetic medications were discussed [FreeTextEntry1] : T2DM\par - great weight loss on Mounjaro, will cont the same dose of 5 mg qw\par - labs today\par - increase fluid intake\par - monitor fasting lipids and decide about statins\par - decrease Benazepril 20 mg qd, continue HCTZ 12.5 mg qd. F/u cardio\par - can take ASA if no contraindications (need to verify with her GI given her history of diverticulitis)\par - resume SMBG. advised on Dexcom G7\par RTC 3 months

## 2023-05-05 ENCOUNTER — TRANSCRIPTION ENCOUNTER (OUTPATIENT)
Age: 58
End: 2023-05-05

## 2023-05-05 LAB
ALBUMIN SERPL ELPH-MCNC: 4.5 G/DL
ALP BLD-CCNC: 104 U/L
ALT SERPL-CCNC: 7 U/L
ANION GAP SERPL CALC-SCNC: 13 MMOL/L
AST SERPL-CCNC: 11 U/L
BILIRUB SERPL-MCNC: 0.3 MG/DL
BUN SERPL-MCNC: 18 MG/DL
CALCIUM SERPL-MCNC: 9.7 MG/DL
CHLORIDE SERPL-SCNC: 102 MMOL/L
CHOLEST SERPL-MCNC: 159 MG/DL
CO2 SERPL-SCNC: 26 MMOL/L
CREAT SERPL-MCNC: 0.81 MG/DL
EGFR: 86 ML/MIN/1.73M2
ESTIMATED AVERAGE GLUCOSE: 120 MG/DL
FRUCTOSAMINE SERPL-MCNC: 252 UMOL/L
GLUCOSE SERPL-MCNC: 81 MG/DL
HBA1C MFR BLD HPLC: 5.8 %
HDLC SERPL-MCNC: 65 MG/DL
LDLC SERPL CALC-MCNC: 84 MG/DL
NONHDLC SERPL-MCNC: 94 MG/DL
POTASSIUM SERPL-SCNC: 4.8 MMOL/L
PROT SERPL-MCNC: 7.5 G/DL
SODIUM SERPL-SCNC: 141 MMOL/L
T4 FREE SERPL-MCNC: 1.3 NG/DL
TRIGL SERPL-MCNC: 49 MG/DL
TSH SERPL-ACNC: 1.1 UIU/ML

## 2023-08-18 ENCOUNTER — APPOINTMENT (OUTPATIENT)
Dept: ENDOCRINOLOGY | Facility: CLINIC | Age: 58
End: 2023-08-18
Payer: COMMERCIAL

## 2023-08-18 VITALS
HEIGHT: 72 IN | BODY MASS INDEX: 24.11 KG/M2 | SYSTOLIC BLOOD PRESSURE: 161 MMHG | DIASTOLIC BLOOD PRESSURE: 99 MMHG | OXYGEN SATURATION: 100 % | HEART RATE: 78 BPM | WEIGHT: 178 LBS

## 2023-08-18 DIAGNOSIS — R94.6 ABNORMAL RESULTS OF THYROID FUNCTION STUDIES: ICD-10-CM

## 2023-08-18 LAB — HBA1C MFR BLD HPLC: 4.9

## 2023-08-18 PROCEDURE — 99214 OFFICE O/P EST MOD 30 MIN: CPT | Mod: 25

## 2023-08-18 PROCEDURE — 83036 HEMOGLOBIN GLYCOSYLATED A1C: CPT | Mod: QW

## 2023-08-18 PROCEDURE — 36415 COLL VENOUS BLD VENIPUNCTURE: CPT

## 2023-08-18 NOTE — HISTORY OF PRESENT ILLNESS
[FreeTextEntry1] : F/u for diabetes management  *** Aug 18, 2023 ***  feels well, diet has been worse recently (vacation) still maintaining her weight. total loss ~ 53 lbs on Mounjaro 5 mg qd, Benazepril 20 mg qd, HCTZ 12.5 mg qd but did not take her BP meds toay not monitoring her FS at home started iron pills for low Hgb. did not see GI yet *** May 04, 2023 ***  taking Mounjaro 5 mg qw. lost 10 more lbs tolerates well, no new c/o not checking FS at home. not using the sensor also, on Benazepril 40 mg qd (skips frequently d/t dizziness), HCTZ 12.5 mg   *** Feb 02, 2023 ***  feels great , lost more weight (totally about 40 lbs) staying on Mounjaro 7.5 mg qw, but has difficulties getting it from the pharmacy   *** Dec 01, 2022 ***  off metformin taking Mounjaro 7.5 mg qw. Lost 30 lbs, and feels great but  does not want to increase the dose anymore not drinking  enough fluids stopped checking her FS  *** Aug 11, 2022 ***  very inconsistent with metformin, but takes Tradjenta 5 mg qd concerned with a weight gain dexcom is expensive, Vu - does not stick well to the skin reports fbs- 80's, ppg- 120's  *** Dec 02, 2021 ***  on metformin er 500 mg 1 tab with dinner,  Tradjenta 5 mg qd did not use vu , b/o it had malfunctioned reports fbs- under 100, ppg - 120's today ppg in the office- 173  *** Jun 17, 2021 ***  on metformin er 500 mg 1 tab with dinner,  Tradjenta 5 mg qd labs from 5/19/21- a1c- 6.4, LDL- 123 reports fbs- low 100's, ppg upto 140 diet is higher in fats recently  *** Feb 25, 2021 ***  on metformin er 500 mg 1 tab with dinner,  Tradjenta 5 mg qd feels well on this regimen. no stomach issue labs from 2/4/21- a1c- 6.6%  wearing vu 2 Date of download:  2/25/21 Diabetes Medications and Dosage: as above Indication for CGMS: verify a change in the treatment regimen, identify periods of hypoglycemia/ hyperglycemia.  Modal day report: pattern.  Pt with HYPO  1% of the time (NONE below 54),  98% in target range Hyperglycemia:  1% elevation  Identified issues: carbohydrate counting dates analyzed: 2/12/21-2/25/21   *** Nov 12, 2020 ***  taking metformin er 500 mg 1 tab tid  (having stomach issues otherwise if takes together). feels that her diverticulitis issues and stomach discomfort is worse sicne increasing metformin dose labs from 10/29/20 - a1c- 6.5, 25D- 34.7, LDL- 93, TSH- 0.866, 279  Date of download:  11/12/20 Diabetes Medications and Dosage: as above Indication for CGMS: verify a change in the treatment regimen, identify periods of hypoglycemia/ hyperglycemia.  Modal day report: pattern.  Pt with HYPO  5% of the time ( 1% below 54),  94% in target range Hyperglycemia:  0% elevation  Identified issues: carbohydrate counting, few episodes of mid 60's dates analyzed: 10/30-20- 11/12/20   HISTORY OF PRESENT ILLNESS.   Ms. GUSMAN was diagnosed with Diabetes Mellitus Type 2 in 2017 Reports history HTN, and denies dyslipidemia, CAD,  known complications of retinopathy, nephropathy, or neuropathy.  Presently on metformin 500 mg qd only.  Blood sugars are  not checked at home Fingerstick glucose in the office today is 110 mg/dL 2 hours after eating.  Diet: not following ADA Exercise: none  Lab review: a1c- 7.0, LDL- 84   ****  Last dilated eye - 2022 Last podiatry visit  - 2022 Last cardiology evaluation - 2022 (Dr. Crook) Last stress test - 2010 angio- 2010 Last 2-D Echo - 2022 Last nephrology evaluation - none Last neurology evaluation- 2018

## 2023-08-18 NOTE — ASSESSMENT
[Diabetes Foot Care] : diabetes foot care [Long Term Vascular Complications] : long term vascular complications of diabetes [Carbohydrate Consistent Diet] : carbohydrate consistent diet [Importance of Diet and Exercise] : importance of diet and exercise to improve glycemic control, achieve weight loss and improve cardiovascular health [Exercise/Effect on Glucose] : exercise/effect on glucose [Hypoglycemia Management] : hypoglycemia management [Glucagon Use] : glucagon use [Self Monitoring of Blood Glucose] : self monitoring of blood glucose [Retinopathy Screening] : Patient was referred to ophthalmology for retinopathy screening [Diabetic Medications] : Risks and benefits of diabetic medications were discussed [FreeTextEntry1] : T2DM - great weight loss on Mounjaro, will cont the same dose of 5 mg qw - labs today. a1c might not be accurate in settings of therapy with iron pills - increase fluid intake - monitor fasting lipids and decide about statins - resume Benazepril 20 mg qd, HCTZ 12.5 mg qd. F/u cardio - can take ASA if no contraindications (need to verify with her GI given her history of diverticulitis) - f/u with PCP/GI re: anemia management - resume SMBG. advised on Dexcom G7 but per patient it's been expensive - thyroid US RTC 3-4 months

## 2023-08-25 ENCOUNTER — TRANSCRIPTION ENCOUNTER (OUTPATIENT)
Age: 58
End: 2023-08-25

## 2023-08-25 LAB
25(OH)D3 SERPL-MCNC: 32.2 NG/ML
ALBUMIN SERPL ELPH-MCNC: 4.7 G/DL
ALP BLD-CCNC: 92 U/L
ALT SERPL-CCNC: 13 U/L
ANION GAP SERPL CALC-SCNC: 10 MMOL/L
AST SERPL-CCNC: 16 U/L
BILIRUB SERPL-MCNC: 0.2 MG/DL
BUN SERPL-MCNC: 16 MG/DL
CALCIUM SERPL-MCNC: 9.1 MG/DL
CHLORIDE SERPL-SCNC: 104 MMOL/L
CHOLEST SERPL-MCNC: 154 MG/DL
CO2 SERPL-SCNC: 25 MMOL/L
CREAT SERPL-MCNC: 0.75 MG/DL
CREAT SPEC-SCNC: 90 MG/DL
EGFR: 94 ML/MIN/1.73M2
ESTIMATED AVERAGE GLUCOSE: 105 MG/DL
FOLATE SERPL-MCNC: >20 NG/ML
FRUCTOSAMINE SERPL-MCNC: 257 UMOL/L
GLUCOSE SERPL-MCNC: 81 MG/DL
GLYCOMARK.: 27 UG/ML
HBA1C MFR BLD HPLC: 5.3 %
HDLC SERPL-MCNC: 69 MG/DL
LDLC SERPL CALC-MCNC: 77 MG/DL
MICROALBUMIN 24H UR DL<=1MG/L-MCNC: <1.2 MG/DL
MICROALBUMIN/CREAT 24H UR-RTO: NORMAL MG/G
NONHDLC SERPL-MCNC: 85 MG/DL
POTASSIUM SERPL-SCNC: 4.1 MMOL/L
PROT SERPL-MCNC: 7.4 G/DL
SODIUM SERPL-SCNC: 139 MMOL/L
T4 FREE SERPL-MCNC: 1.1 NG/DL
TRIGL SERPL-MCNC: 33 MG/DL
TSH SERPL-ACNC: 1.57 UIU/ML
VIT B12 SERPL-MCNC: 905 PG/ML

## 2023-09-27 ENCOUNTER — TRANSCRIPTION ENCOUNTER (OUTPATIENT)
Age: 58
End: 2023-09-27

## 2023-11-30 ENCOUNTER — APPOINTMENT (OUTPATIENT)
Dept: ENDOCRINOLOGY | Facility: CLINIC | Age: 58
End: 2023-11-30
Payer: COMMERCIAL

## 2023-11-30 VITALS
RESPIRATION RATE: 16 BRPM | WEIGHT: 168 LBS | SYSTOLIC BLOOD PRESSURE: 146 MMHG | HEART RATE: 72 BPM | OXYGEN SATURATION: 100 % | TEMPERATURE: 98.6 F | DIASTOLIC BLOOD PRESSURE: 88 MMHG | BODY MASS INDEX: 22.75 KG/M2 | HEIGHT: 72 IN

## 2023-11-30 DIAGNOSIS — K57.32 DIVERTICULITIS OF LARGE INTESTINE W/OUT PERFORATION OR ABSCESS W/OUT BLEEDING: ICD-10-CM

## 2023-11-30 PROCEDURE — 36415 COLL VENOUS BLD VENIPUNCTURE: CPT

## 2023-11-30 PROCEDURE — 99214 OFFICE O/P EST MOD 30 MIN: CPT | Mod: 25

## 2023-11-30 RX ORDER — TIRZEPATIDE 2.5 MG/.5ML
2.5 INJECTION, SOLUTION SUBCUTANEOUS
Qty: 3 | Refills: 3 | Status: ACTIVE | COMMUNITY
Start: 2022-08-11 | End: 1900-01-01

## 2023-12-03 LAB
25(OH)D3 SERPL-MCNC: 27.3 NG/ML
ALBUMIN SERPL ELPH-MCNC: 4.6 G/DL
ALP BLD-CCNC: 91 U/L
ALT SERPL-CCNC: 8 U/L
ANION GAP SERPL CALC-SCNC: 11 MMOL/L
AST SERPL-CCNC: 16 U/L
BASOPHILS # BLD AUTO: 0.06 K/UL
BASOPHILS NFR BLD AUTO: 1.1 %
BILIRUB SERPL-MCNC: 0.3 MG/DL
BUN SERPL-MCNC: 15 MG/DL
CALCIUM SERPL-MCNC: 9.9 MG/DL
CHLORIDE SERPL-SCNC: 103 MMOL/L
CHOLEST SERPL-MCNC: 164 MG/DL
CO2 SERPL-SCNC: 28 MMOL/L
CREAT SERPL-MCNC: 0.85 MG/DL
EGFR: 80 ML/MIN/1.73M2
EOSINOPHIL # BLD AUTO: 0.12 K/UL
EOSINOPHIL NFR BLD AUTO: 2.3 %
FOLATE SERPL-MCNC: 14.5 NG/ML
FRUCTOSAMINE SERPL-MCNC: 262 UMOL/L
GLUCOSE SERPL-MCNC: 89 MG/DL
GLYCOMARK.: 25.2 UG/ML
HCT VFR BLD CALC: 38.2 %
HDLC SERPL-MCNC: 78 MG/DL
HGB BLD-MCNC: 11.6 G/DL
IMM GRANULOCYTES NFR BLD AUTO: 0.2 %
IRON SATN MFR SERPL: 15 %
IRON SERPL-MCNC: 66 UG/DL
LDLC SERPL CALC-MCNC: 78 MG/DL
LYMPHOCYTES # BLD AUTO: 2.49 K/UL
LYMPHOCYTES NFR BLD AUTO: 46.7 %
MAN DIFF?: NORMAL
MCHC RBC-ENTMCNC: 22.3 PG
MCHC RBC-ENTMCNC: 30.4 GM/DL
MCV RBC AUTO: 73.5 FL
MONOCYTES # BLD AUTO: 0.5 K/UL
MONOCYTES NFR BLD AUTO: 9.4 %
NEUTROPHILS # BLD AUTO: 2.15 K/UL
NEUTROPHILS NFR BLD AUTO: 40.3 %
NONHDLC SERPL-MCNC: 85 MG/DL
PLATELET # BLD AUTO: 347 K/UL
POTASSIUM SERPL-SCNC: 4.5 MMOL/L
PROT SERPL-MCNC: 7.6 G/DL
RBC # BLD: 5.2 M/UL
RBC # FLD: 18.4 %
SODIUM SERPL-SCNC: 142 MMOL/L
T4 FREE SERPL-MCNC: 1.4 NG/DL
TIBC SERPL-MCNC: 448 UG/DL
TRIGL SERPL-MCNC: 29 MG/DL
TSH SERPL-ACNC: 1.17 UIU/ML
UIBC SERPL-MCNC: 382 UG/DL
VIT B12 SERPL-MCNC: 1056 PG/ML
WBC # FLD AUTO: 5.33 K/UL

## 2024-03-07 ENCOUNTER — APPOINTMENT (OUTPATIENT)
Dept: ENDOCRINOLOGY | Facility: CLINIC | Age: 59
End: 2024-03-07
Payer: COMMERCIAL

## 2024-03-07 VITALS
BODY MASS INDEX: 23.7 KG/M2 | DIASTOLIC BLOOD PRESSURE: 76 MMHG | WEIGHT: 175 LBS | OXYGEN SATURATION: 99 % | RESPIRATION RATE: 16 BRPM | HEART RATE: 74 BPM | TEMPERATURE: 97.6 F | SYSTOLIC BLOOD PRESSURE: 136 MMHG | HEIGHT: 72 IN

## 2024-03-07 DIAGNOSIS — E11.9 TYPE 2 DIABETES MELLITUS W/OUT COMPLICATIONS: ICD-10-CM

## 2024-03-07 DIAGNOSIS — E78.5 HYPERLIPIDEMIA, UNSPECIFIED: ICD-10-CM

## 2024-03-07 DIAGNOSIS — E04.1 NONTOXIC SINGLE THYROID NODULE: ICD-10-CM

## 2024-03-07 DIAGNOSIS — I10 ESSENTIAL (PRIMARY) HYPERTENSION: ICD-10-CM

## 2024-03-07 LAB
BASOPHILS # BLD AUTO: 0.04 K/UL
BASOPHILS NFR BLD AUTO: 0.9 %
EOSINOPHIL # BLD AUTO: 0.17 K/UL
EOSINOPHIL NFR BLD AUTO: 3.8 %
GLUCOSE BLDC GLUCOMTR-MCNC: 119
HCT VFR BLD CALC: 38.2 %
HGB BLD-MCNC: 11.7 G/DL
IMM GRANULOCYTES NFR BLD AUTO: 0.2 %
LYMPHOCYTES # BLD AUTO: 2.01 K/UL
LYMPHOCYTES NFR BLD AUTO: 44.4 %
MAN DIFF?: NORMAL
MCHC RBC-ENTMCNC: 23 PG
MCHC RBC-ENTMCNC: 30.6 GM/DL
MCV RBC AUTO: 75 FL
MONOCYTES # BLD AUTO: 0.42 K/UL
MONOCYTES NFR BLD AUTO: 9.3 %
NEUTROPHILS # BLD AUTO: 1.88 K/UL
NEUTROPHILS NFR BLD AUTO: 41.4 %
PLATELET # BLD AUTO: 275 K/UL
RBC # BLD: 5.09 M/UL
RBC # FLD: 15.8 %
WBC # FLD AUTO: 4.53 K/UL

## 2024-03-07 PROCEDURE — 99214 OFFICE O/P EST MOD 30 MIN: CPT | Mod: 25

## 2024-03-07 PROCEDURE — 82962 GLUCOSE BLOOD TEST: CPT

## 2024-03-07 PROCEDURE — 36415 COLL VENOUS BLD VENIPUNCTURE: CPT

## 2024-03-07 NOTE — ASSESSMENT
[Diabetes Foot Care] : diabetes foot care [Long Term Vascular Complications] : long term vascular complications of diabetes [Carbohydrate Consistent Diet] : carbohydrate consistent diet [Importance of Diet and Exercise] : importance of diet and exercise to improve glycemic control, achieve weight loss and improve cardiovascular health [Exercise/Effect on Glucose] : exercise/effect on glucose [Hypoglycemia Management] : hypoglycemia management [Glucagon Use] : glucagon use [Self Monitoring of Blood Glucose] : self monitoring of blood glucose [Retinopathy Screening] : Patient was referred to ophthalmology for retinopathy screening [Diabetic Medications] : Risks and benefits of diabetic medications were discussed [FreeTextEntry1] : 1. T2DM - great weight loss on Mounjaro, continue at  2.5 mg qw - labs today. a1c might not be accurate in settings of therapy with iron pills - monitor fasting lipids and decide about statins - Benazepril 20 mg qd, HCTZ 12.5 mg qd. F/u cardio - can take ASA if no contraindications (need to verify with her GI given her history of diverticulitis) - f/u with PCP/GI re: anemia management - resume SMBG. advised on Dexcom G7 but per patient it's been expensive  2. Tiny subcm non-suspicious thyroid cyst - repeat thyroid US in 2-3 yrs, and if stable prn after RTC 3-4 months.

## 2024-03-07 NOTE — HISTORY OF PRESENT ILLNESS
[FreeTextEntry1] : F/u for diabetes management  *** Mar 07, 2024 ***  feels better on iron supplements. had an EGD done- overall normal. small hiatal hernia (stable) regained some weight since lowering mounjaro 2.5 mg qw staying on  Benazepril 20 mg qd, HCTZ 12.5 mg qd  *** Nov 30, 2023 ***  feels more tired lately. Lost more weight. still taking iron pills. has not f/u with GI yet taking Mounjaro 5 mg qw, Benazepril 20 mg qd, HCTZ 12.5 mg qd not monitoring FS, CGM not approved  [facility] US (9/7/23)- [facility] cystic [id number]  *** Aug 18, 2023 ***  feels well, diet has been worse recently (vacation) still maintaining her weight. total loss ~ 53 lbs on Mounjaro 5 mg qd, Benazepril 20 mg qd, HCTZ 12.5 mg qd but did not take her BP meds toay not monitoring her FS at home started iron pills for low Hgb. did not see GI yet *** May 04, 2023 ***  taking Mounjaro 5 mg qw. lost 10 more lbs tolerates well, no new c/o not checking FS at home. not using the sensor also, on Benazepril 40 mg qd (skips frequently d/t dizziness), HCTZ 12.5 mg   *** Feb 02, 2023 ***  feels great , lost more weight (totally about 40 lbs) staying on Mounjaro 7.5 mg qw, but has difficulties getting it from the pharmacy   *** Dec 01, 2022 ***  off metformin taking Mounjaro 7.5 mg qw. Lost 30 lbs, and feels great but  does not want to increase the dose anymore not drinking  enough fluids stopped checking her FS  *** Aug 11, 2022 ***  very inconsistent with metformin, but takes Tradjenta 5 mg qd concerned with a weight gain dexcom is expensive, Vu - does not stick well to the skin reports fbs- 80's, ppg- 120's  *** Dec 02, 2021 ***  on metformin er 500 mg 1 tab with dinner,  Tradjenta 5 mg qd did not use vu , b/o it had malfunctioned reports fbs- under 100, ppg - 120's today ppg in the office- 173  *** Jun 17, 2021 ***  on metformin er 500 mg 1 tab with dinner,  Tradjenta 5 mg qd labs from 5/19/21- a1c- 6.4, LDL- 123 reports fbs- low 100's, ppg upto 140 diet is higher in fats recently  *** Feb 25, 2021 ***  on metformin er 500 mg 1 tab with dinner,  Tradjenta 5 mg qd feels well on this regimen. no stomach issue labs from 2/4/21- a1c- 6.6%  wearing vu 2 Date of download:  2/25/21 Diabetes Medications and Dosage: as above Indication for CGMS: verify a change in the treatment regimen, identify periods of hypoglycemia/ hyperglycemia.  Modal day report: pattern.  Pt with HYPO  1% of the time (NONE below 54),  98% in target range Hyperglycemia:  1% elevation  Identified issues: carbohydrate counting dates analyzed: 2/12/21-2/25/21   *** Nov 12, 2020 ***  taking metformin er 500 mg 1 tab tid  (having stomach issues otherwise if takes together). feels that her diverticulitis issues and stomach discomfort is worse sicne increasing metformin dose labs from 10/29/20 - a1c- 6.5, 25D- 34.7, LDL- 93, TSH- 0.866, 279  Date of download:  11/12/20 Diabetes Medications and Dosage: as above Indication for CGMS: verify a change in the treatment regimen, identify periods of hypoglycemia/ hyperglycemia.  Modal day report: pattern.  Pt with HYPO  5% of the time ( 1% below 54),  94% in target range Hyperglycemia:  0% elevation  Identified issues: carbohydrate counting, few episodes of mid 60's dates analyzed: 10/30-20- 11/12/20   HISTORY OF PRESENT ILLNESS.   Ms. GUSMAN was diagnosed with Diabetes Mellitus Type 2 in 2017 Reports history HTN, and denies dyslipidemia, CAD,  known complications of retinopathy, nephropathy, or neuropathy.  Presently on metformin 500 mg qd only.  Blood sugars are  not checked at home Fingerstick glucose in the office today is 110 mg/dL 2 hours after eating.  Diet: not following ADA Exercise: none  Lab review: a1c- 7.0, LDL- 84   ****  Last dilated eye - 9/23 Last podiatry visit  - 9/23 Last cardiology evaluation - 8/23 (Dr. Antony Crook) Last stress test - 2010 angio- 2010 Last 2-D Echo - 8/23 Last nephrology evaluation - none Last neurology evaluation- 2018

## 2024-03-08 ENCOUNTER — TRANSCRIPTION ENCOUNTER (OUTPATIENT)
Age: 59
End: 2024-03-08

## 2024-03-08 LAB
25(OH)D3 SERPL-MCNC: 33.8 NG/ML
ALBUMIN SERPL ELPH-MCNC: 4.5 G/DL
ALP BLD-CCNC: 92 U/L
ALT SERPL-CCNC: 13 U/L
ANION GAP SERPL CALC-SCNC: 10 MMOL/L
AST SERPL-CCNC: 14 U/L
BILIRUB SERPL-MCNC: 0.2 MG/DL
BUN SERPL-MCNC: 17 MG/DL
CALCIUM SERPL-MCNC: 9.2 MG/DL
CHLORIDE SERPL-SCNC: 102 MMOL/L
CHOLEST SERPL-MCNC: 171 MG/DL
CO2 SERPL-SCNC: 27 MMOL/L
CREAT SERPL-MCNC: 0.91 MG/DL
EGFR: 74 ML/MIN/1.73M2
ESTIMATED AVERAGE GLUCOSE: 105 MG/DL
FOLATE SERPL-MCNC: >20 NG/ML
GLUCOSE SERPL-MCNC: 107 MG/DL
HBA1C MFR BLD HPLC: 5.3 %
HDLC SERPL-MCNC: 80 MG/DL
IRON SATN MFR SERPL: 12 %
IRON SERPL-MCNC: 46 UG/DL
LDLC SERPL CALC-MCNC: 85 MG/DL
NONHDLC SERPL-MCNC: 92 MG/DL
POTASSIUM SERPL-SCNC: 4.3 MMOL/L
PROT SERPL-MCNC: 6.9 G/DL
SODIUM SERPL-SCNC: 140 MMOL/L
T4 FREE SERPL-MCNC: 1.3 NG/DL
TIBC SERPL-MCNC: 382 UG/DL
TRIGL SERPL-MCNC: 27 MG/DL
TSH SERPL-ACNC: 1.03 UIU/ML
UIBC SERPL-MCNC: 336 UG/DL
VIT B12 SERPL-MCNC: 1923 PG/ML

## 2024-07-18 ENCOUNTER — APPOINTMENT (OUTPATIENT)
Dept: ENDOCRINOLOGY | Facility: CLINIC | Age: 59
End: 2024-07-18
Payer: COMMERCIAL

## 2024-07-18 VITALS
BODY MASS INDEX: 24.52 KG/M2 | HEIGHT: 72 IN | WEIGHT: 181 LBS | OXYGEN SATURATION: 98 % | RESPIRATION RATE: 16 BRPM | TEMPERATURE: 98.3 F | DIASTOLIC BLOOD PRESSURE: 82 MMHG | HEART RATE: 64 BPM | SYSTOLIC BLOOD PRESSURE: 133 MMHG

## 2024-07-18 DIAGNOSIS — E78.5 HYPERLIPIDEMIA, UNSPECIFIED: ICD-10-CM

## 2024-07-18 DIAGNOSIS — E04.1 NONTOXIC SINGLE THYROID NODULE: ICD-10-CM

## 2024-07-18 DIAGNOSIS — E11.9 TYPE 2 DIABETES MELLITUS W/OUT COMPLICATIONS: ICD-10-CM

## 2024-07-18 DIAGNOSIS — I10 ESSENTIAL (PRIMARY) HYPERTENSION: ICD-10-CM

## 2024-07-18 LAB — GLUCOSE BLDC GLUCOMTR-MCNC: 83

## 2024-07-18 PROCEDURE — 36415 COLL VENOUS BLD VENIPUNCTURE: CPT

## 2024-07-18 PROCEDURE — 99214 OFFICE O/P EST MOD 30 MIN: CPT | Mod: 25

## 2024-07-18 PROCEDURE — 82962 GLUCOSE BLOOD TEST: CPT

## 2024-07-19 ENCOUNTER — TRANSCRIPTION ENCOUNTER (OUTPATIENT)
Age: 59
End: 2024-07-19

## 2024-07-19 LAB
25(OH)D3 SERPL-MCNC: 49.1 NG/ML
ALBUMIN SERPL ELPH-MCNC: 4.6 G/DL
ALP BLD-CCNC: 93 U/L
ALT SERPL-CCNC: 15 U/L
ANION GAP SERPL CALC-SCNC: 17 MMOL/L
AST SERPL-CCNC: 21 U/L
BILIRUB SERPL-MCNC: 0.4 MG/DL
BUN SERPL-MCNC: 12 MG/DL
CALCIUM SERPL-MCNC: 9.5 MG/DL
CHLORIDE SERPL-SCNC: 103 MMOL/L
CHOLEST SERPL-MCNC: 163 MG/DL
CO2 SERPL-SCNC: 25 MMOL/L
CREAT SERPL-MCNC: 0.91 MG/DL
CREAT SPEC-SCNC: 110 MG/DL
EGFR: 74 ML/MIN/1.73M2
FRUCTOSAMINE SERPL-MCNC: 259 UMOL/L
GLUCOSE SERPL-MCNC: 65 MG/DL
HDLC SERPL-MCNC: 77 MG/DL
LDLC SERPL CALC-MCNC: 78 MG/DL
MICROALBUMIN 24H UR DL<=1MG/L-MCNC: <1.2 MG/DL
MICROALBUMIN/CREAT 24H UR-RTO: NORMAL MG/G
NONHDLC SERPL-MCNC: 86 MG/DL
POTASSIUM SERPL-SCNC: 4.6 MMOL/L
PROT SERPL-MCNC: 7.4 G/DL
SODIUM SERPL-SCNC: 145 MMOL/L
TRIGL SERPL-MCNC: 33 MG/DL
TSH SERPL-ACNC: 1.24 UIU/ML

## 2024-07-21 LAB
ESTIMATED AVERAGE GLUCOSE: 108 MG/DL
HBA1C MFR BLD HPLC: 5.4 %

## 2024-10-24 ENCOUNTER — APPOINTMENT (OUTPATIENT)
Dept: ENDOCRINOLOGY | Facility: CLINIC | Age: 59
End: 2024-10-24
Payer: COMMERCIAL

## 2024-10-24 VITALS
SYSTOLIC BLOOD PRESSURE: 140 MMHG | BODY MASS INDEX: 25.33 KG/M2 | RESPIRATION RATE: 16 BRPM | DIASTOLIC BLOOD PRESSURE: 88 MMHG | TEMPERATURE: 99 F | WEIGHT: 187 LBS | HEIGHT: 72 IN | OXYGEN SATURATION: 100 % | HEART RATE: 81 BPM

## 2024-10-24 DIAGNOSIS — E04.1 NONTOXIC SINGLE THYROID NODULE: ICD-10-CM

## 2024-10-24 DIAGNOSIS — E28.2 POLYCYSTIC OVARIAN SYNDROME: ICD-10-CM

## 2024-10-24 DIAGNOSIS — E78.5 HYPERLIPIDEMIA, UNSPECIFIED: ICD-10-CM

## 2024-10-24 DIAGNOSIS — E11.9 TYPE 2 DIABETES MELLITUS W/OUT COMPLICATIONS: ICD-10-CM

## 2024-10-24 DIAGNOSIS — I10 ESSENTIAL (PRIMARY) HYPERTENSION: ICD-10-CM

## 2024-10-24 LAB — GLUCOSE BLDC GLUCOMTR-MCNC: 140

## 2024-10-24 PROCEDURE — 82962 GLUCOSE BLOOD TEST: CPT

## 2024-10-24 PROCEDURE — 36415 COLL VENOUS BLD VENIPUNCTURE: CPT

## 2024-10-24 PROCEDURE — 99214 OFFICE O/P EST MOD 30 MIN: CPT

## 2024-10-24 RX ORDER — TIRZEPATIDE 2.5 MG/.5ML
2.5 INJECTION, SOLUTION SUBCUTANEOUS
Qty: 3 | Refills: 2 | Status: ACTIVE | COMMUNITY
Start: 2024-10-24 | End: 1900-01-01

## 2024-10-25 ENCOUNTER — TRANSCRIPTION ENCOUNTER (OUTPATIENT)
Age: 59
End: 2024-10-25

## 2024-10-25 LAB
25(OH)D3 SERPL-MCNC: 42.3 NG/ML
ALBUMIN SERPL ELPH-MCNC: 4.4 G/DL
ALP BLD-CCNC: 102 U/L
ALT SERPL-CCNC: 11 U/L
ANION GAP SERPL CALC-SCNC: 15 MMOL/L
AST SERPL-CCNC: 18 U/L
BILIRUB SERPL-MCNC: 0.2 MG/DL
BUN SERPL-MCNC: 11 MG/DL
CALCIUM SERPL-MCNC: 9.4 MG/DL
CHLORIDE SERPL-SCNC: 100 MMOL/L
CHOLEST SERPL-MCNC: 169 MG/DL
CO2 SERPL-SCNC: 26 MMOL/L
CREAT SERPL-MCNC: 0.82 MG/DL
CREAT SPEC-SCNC: 53 MG/DL
EGFR: 84 ML/MIN/1.73M2
ESTIMATED AVERAGE GLUCOSE: 108 MG/DL
FOLATE SERPL-MCNC: 18 NG/ML
FRUCTOSAMINE SERPL-MCNC: 249 UMOL/L
GLUCOSE SERPL-MCNC: 124 MG/DL
GLYCOMARK.: 26.9 UG/ML
HBA1C MFR BLD HPLC: 5.4 %
HDLC SERPL-MCNC: 77 MG/DL
LDLC SERPL CALC-MCNC: 79 MG/DL
MICROALBUMIN 24H UR DL<=1MG/L-MCNC: <1.2 MG/DL
MICROALBUMIN/CREAT 24H UR-RTO: NORMAL MG/G
NONHDLC SERPL-MCNC: 92 MG/DL
POTASSIUM SERPL-SCNC: 4.3 MMOL/L
PROT SERPL-MCNC: 7.2 G/DL
SODIUM SERPL-SCNC: 141 MMOL/L
T4 FREE SERPL-MCNC: 1.2 NG/DL
TRIGL SERPL-MCNC: 67 MG/DL
TSH SERPL-ACNC: 0.85 UIU/ML
VIT B12 SERPL-MCNC: >2000 PG/ML

## 2025-01-30 ENCOUNTER — APPOINTMENT (OUTPATIENT)
Dept: ENDOCRINOLOGY | Facility: CLINIC | Age: 60
End: 2025-01-30
Payer: COMMERCIAL

## 2025-01-30 VITALS
TEMPERATURE: 98.4 F | DIASTOLIC BLOOD PRESSURE: 82 MMHG | RESPIRATION RATE: 16 BRPM | WEIGHT: 189 LBS | HEIGHT: 72 IN | BODY MASS INDEX: 25.6 KG/M2 | SYSTOLIC BLOOD PRESSURE: 133 MMHG | OXYGEN SATURATION: 99 % | HEART RATE: 70 BPM

## 2025-01-30 DIAGNOSIS — E11.9 TYPE 2 DIABETES MELLITUS W/OUT COMPLICATIONS: ICD-10-CM

## 2025-01-30 DIAGNOSIS — E78.5 HYPERLIPIDEMIA, UNSPECIFIED: ICD-10-CM

## 2025-01-30 DIAGNOSIS — I10 ESSENTIAL (PRIMARY) HYPERTENSION: ICD-10-CM

## 2025-01-30 DIAGNOSIS — E04.1 NONTOXIC SINGLE THYROID NODULE: ICD-10-CM

## 2025-01-30 LAB — GLUCOSE BLDC GLUCOMTR-MCNC: 92

## 2025-01-30 PROCEDURE — 36415 COLL VENOUS BLD VENIPUNCTURE: CPT

## 2025-01-30 PROCEDURE — 99214 OFFICE O/P EST MOD 30 MIN: CPT

## 2025-01-30 PROCEDURE — 82962 GLUCOSE BLOOD TEST: CPT

## 2025-01-31 ENCOUNTER — TRANSCRIPTION ENCOUNTER (OUTPATIENT)
Age: 60
End: 2025-01-31

## 2025-01-31 LAB
25(OH)D3 SERPL-MCNC: 47.5 NG/ML
ALBUMIN SERPL ELPH-MCNC: 4.1 G/DL
ALP BLD-CCNC: 99 U/L
ALT SERPL-CCNC: 7 U/L
ANION GAP SERPL CALC-SCNC: 11 MMOL/L
AST SERPL-CCNC: 15 U/L
BASOPHILS # BLD AUTO: 0.05 K/UL
BASOPHILS NFR BLD AUTO: 1.3 %
BILIRUB SERPL-MCNC: 0.3 MG/DL
BUN SERPL-MCNC: 18 MG/DL
CALCIUM SERPL-MCNC: 9.3 MG/DL
CHLORIDE SERPL-SCNC: 102 MMOL/L
CHOLEST SERPL-MCNC: 170 MG/DL
CO2 SERPL-SCNC: 29 MMOL/L
CREAT SERPL-MCNC: 0.89 MG/DL
EGFR: 76 ML/MIN/1.73M2
EOSINOPHIL # BLD AUTO: 0.15 K/UL
EOSINOPHIL NFR BLD AUTO: 3.8 %
ESTIMATED AVERAGE GLUCOSE: 108 MG/DL
FOLATE SERPL-MCNC: >20 NG/ML
FRUCTOSAMINE SERPL-MCNC: 234 UMOL/L
GLUCOSE SERPL-MCNC: 86 MG/DL
HBA1C MFR BLD HPLC: 5.4 %
HCT VFR BLD CALC: 38.6 %
HDLC SERPL-MCNC: 70 MG/DL
HGB BLD-MCNC: 11.7 G/DL
IMM GRANULOCYTES NFR BLD AUTO: 0 %
IRON SATN MFR SERPL: 30 %
IRON SERPL-MCNC: 113 UG/DL
LDLC SERPL CALC-MCNC: 92 MG/DL
LYMPHOCYTES # BLD AUTO: 1.72 K/UL
LYMPHOCYTES NFR BLD AUTO: 44 %
MAN DIFF?: NORMAL
MCHC RBC-ENTMCNC: 22.7 PG
MCHC RBC-ENTMCNC: 30.3 G/DL
MCV RBC AUTO: 74.8 FL
MONOCYTES # BLD AUTO: 0.38 K/UL
MONOCYTES NFR BLD AUTO: 9.7 %
NEUTROPHILS # BLD AUTO: 1.61 K/UL
NEUTROPHILS NFR BLD AUTO: 41.2 %
NONHDLC SERPL-MCNC: 100 MG/DL
PLATELET # BLD AUTO: 287 K/UL
POTASSIUM SERPL-SCNC: 4.4 MMOL/L
POTASSIUM SERPL-SCNC: 4.7 MMOL/L
PROT SERPL-MCNC: 6.9 G/DL
RBC # BLD: 5.16 M/UL
RBC # FLD: 15 %
SODIUM SERPL-SCNC: 141 MMOL/L
T4 FREE SERPL-MCNC: 1.3 NG/DL
TIBC SERPL-MCNC: 376 UG/DL
TRIGL SERPL-MCNC: 37 MG/DL
TSH SERPL-ACNC: 1.2 UIU/ML
UIBC SERPL-MCNC: 263 UG/DL
VIT B12 SERPL-MCNC: >2000 PG/ML
WBC # FLD AUTO: 3.91 K/UL

## 2025-05-01 ENCOUNTER — APPOINTMENT (OUTPATIENT)
Dept: ENDOCRINOLOGY | Facility: CLINIC | Age: 60
End: 2025-05-01
Payer: COMMERCIAL

## 2025-05-01 VITALS
BODY MASS INDEX: 25.73 KG/M2 | HEART RATE: 86 BPM | RESPIRATION RATE: 16 BRPM | SYSTOLIC BLOOD PRESSURE: 130 MMHG | WEIGHT: 190 LBS | HEIGHT: 72 IN | OXYGEN SATURATION: 100 % | DIASTOLIC BLOOD PRESSURE: 76 MMHG

## 2025-05-01 DIAGNOSIS — I10 ESSENTIAL (PRIMARY) HYPERTENSION: ICD-10-CM

## 2025-05-01 DIAGNOSIS — N95.1 MENOPAUSAL AND FEMALE CLIMACTERIC STATES: ICD-10-CM

## 2025-05-01 DIAGNOSIS — E78.5 HYPERLIPIDEMIA, UNSPECIFIED: ICD-10-CM

## 2025-05-01 DIAGNOSIS — E11.9 TYPE 2 DIABETES MELLITUS W/OUT COMPLICATIONS: ICD-10-CM

## 2025-05-01 LAB — GLUCOSE BLDC GLUCOMTR-MCNC: 84

## 2025-05-01 PROCEDURE — 82962 GLUCOSE BLOOD TEST: CPT

## 2025-05-01 PROCEDURE — 36415 COLL VENOUS BLD VENIPUNCTURE: CPT

## 2025-05-01 PROCEDURE — 99214 OFFICE O/P EST MOD 30 MIN: CPT

## 2025-05-01 RX ORDER — FEZOLINETANT 45 MG/1
45 TABLET, FILM COATED ORAL
Qty: 1 | Refills: 6 | Status: ACTIVE | COMMUNITY
Start: 2025-05-01 | End: 1900-01-01

## 2025-05-02 ENCOUNTER — TRANSCRIPTION ENCOUNTER (OUTPATIENT)
Age: 60
End: 2025-05-02

## 2025-05-02 LAB
ALBUMIN SERPL ELPH-MCNC: 4.7 G/DL
ALP BLD-CCNC: 97 U/L
ALT SERPL-CCNC: 15 U/L
ANION GAP SERPL CALC-SCNC: 14 MMOL/L
AST SERPL-CCNC: 19 U/L
BILIRUB SERPL-MCNC: 0.4 MG/DL
BUN SERPL-MCNC: 13 MG/DL
CALCIUM SERPL-MCNC: 10.2 MG/DL
CHLORIDE SERPL-SCNC: 101 MMOL/L
CHOLEST SERPL-MCNC: 195 MG/DL
CO2 SERPL-SCNC: 25 MMOL/L
CREAT SERPL-MCNC: 0.84 MG/DL
CREAT SPEC-SCNC: 76 MG/DL
EGFRCR SERPLBLD CKD-EPI 2021: 81 ML/MIN/1.73M2
ESTIMATED AVERAGE GLUCOSE: 108 MG/DL
FOLATE SERPL-MCNC: >20 NG/ML
GLUCOSE SERPL-MCNC: 76 MG/DL
HBA1C MFR BLD HPLC: 5.4 %
HDLC SERPL-MCNC: 86 MG/DL
LDLC SERPL-MCNC: 103 MG/DL
MICROALBUMIN 24H UR DL<=1MG/L-MCNC: <1.2 MG/DL
MICROALBUMIN/CREAT 24H UR-RTO: NORMAL MG/G
NONHDLC SERPL-MCNC: 108 MG/DL
POTASSIUM SERPL-SCNC: 5 MMOL/L
PROT SERPL-MCNC: 7.5 G/DL
SODIUM SERPL-SCNC: 140 MMOL/L
TRIGL SERPL-MCNC: 25 MG/DL
TSH SERPL-ACNC: 1.63 UIU/ML
VIT B12 SERPL-MCNC: >2000 PG/ML

## 2025-08-28 ENCOUNTER — APPOINTMENT (OUTPATIENT)
Dept: ENDOCRINOLOGY | Facility: CLINIC | Age: 60
End: 2025-08-28
Payer: COMMERCIAL

## 2025-08-28 VITALS
TEMPERATURE: 98.4 F | OXYGEN SATURATION: 98 % | BODY MASS INDEX: 26.55 KG/M2 | DIASTOLIC BLOOD PRESSURE: 89 MMHG | HEART RATE: 73 BPM | HEIGHT: 72 IN | RESPIRATION RATE: 17 BRPM | WEIGHT: 196 LBS | SYSTOLIC BLOOD PRESSURE: 147 MMHG

## 2025-08-28 VITALS — SYSTOLIC BLOOD PRESSURE: 128 MMHG | DIASTOLIC BLOOD PRESSURE: 78 MMHG

## 2025-08-28 DIAGNOSIS — E78.5 HYPERLIPIDEMIA, UNSPECIFIED: ICD-10-CM

## 2025-08-28 DIAGNOSIS — E04.1 NONTOXIC SINGLE THYROID NODULE: ICD-10-CM

## 2025-08-28 DIAGNOSIS — E11.9 TYPE 2 DIABETES MELLITUS W/OUT COMPLICATIONS: ICD-10-CM

## 2025-08-28 DIAGNOSIS — I10 ESSENTIAL (PRIMARY) HYPERTENSION: ICD-10-CM

## 2025-08-28 DIAGNOSIS — N95.1 MENOPAUSAL AND FEMALE CLIMACTERIC STATES: ICD-10-CM

## 2025-08-28 LAB
GLUCOSE BLDC GLUCOMTR-MCNC: 98
HBA1C MFR BLD HPLC: 5.5

## 2025-08-28 PROCEDURE — 82962 GLUCOSE BLOOD TEST: CPT

## 2025-08-28 PROCEDURE — 99214 OFFICE O/P EST MOD 30 MIN: CPT

## 2025-08-28 PROCEDURE — G2211 COMPLEX E/M VISIT ADD ON: CPT | Mod: NC

## 2025-08-28 PROCEDURE — 83036 HEMOGLOBIN GLYCOSYLATED A1C: CPT | Mod: QW
